# Patient Record
Sex: MALE | Race: WHITE | NOT HISPANIC OR LATINO | Employment: OTHER | URBAN - METROPOLITAN AREA
[De-identification: names, ages, dates, MRNs, and addresses within clinical notes are randomized per-mention and may not be internally consistent; named-entity substitution may affect disease eponyms.]

---

## 2020-09-09 ENCOUNTER — OFFICE VISIT (OUTPATIENT)
Dept: NEUROLOGY | Facility: CLINIC | Age: 80
End: 2020-09-09
Payer: MEDICARE

## 2020-09-09 VITALS
DIASTOLIC BLOOD PRESSURE: 86 MMHG | WEIGHT: 152.75 LBS | HEART RATE: 98 BPM | SYSTOLIC BLOOD PRESSURE: 142 MMHG | TEMPERATURE: 98 F | RESPIRATION RATE: 16 BRPM

## 2020-09-09 DIAGNOSIS — R41.3 MEMORY LOSS: ICD-10-CM

## 2020-09-09 DIAGNOSIS — I69.30 LATE EFFECT OF CEREBROVASCULAR ACCIDENT (CVA): Primary | ICD-10-CM

## 2020-09-09 DIAGNOSIS — I67.2 CEREBRAL ATHEROSCLEROSIS: ICD-10-CM

## 2020-09-09 DIAGNOSIS — G47.09 OTHER INSOMNIA: ICD-10-CM

## 2020-09-09 DIAGNOSIS — F41.9 ANXIETY: ICD-10-CM

## 2020-09-09 DIAGNOSIS — R03.0 ELEVATED BLOOD-PRESSURE READING WITHOUT DIAGNOSIS OF HYPERTENSION: ICD-10-CM

## 2020-09-09 DIAGNOSIS — H91.93 BILATERAL HEARING LOSS, UNSPECIFIED HEARING LOSS TYPE: ICD-10-CM

## 2020-09-09 PROCEDURE — 1126F PR PAIN SEVERITY QUANTIFIED, NO PAIN PRESENT: ICD-10-PCS | Mod: S$GLB,,, | Performed by: PSYCHIATRY & NEUROLOGY

## 2020-09-09 PROCEDURE — 1159F PR MEDICATION LIST DOCUMENTED IN MEDICAL RECORD: ICD-10-PCS | Mod: S$GLB,,, | Performed by: PSYCHIATRY & NEUROLOGY

## 2020-09-09 PROCEDURE — 99204 PR OFFICE/OUTPT VISIT, NEW, LEVL IV, 45-59 MIN: ICD-10-PCS | Mod: S$GLB,,, | Performed by: PSYCHIATRY & NEUROLOGY

## 2020-09-09 PROCEDURE — 1159F MED LIST DOCD IN RCRD: CPT | Mod: S$GLB,,, | Performed by: PSYCHIATRY & NEUROLOGY

## 2020-09-09 PROCEDURE — 99999 PR PBB SHADOW E&M-NEW PATIENT-LVL III: ICD-10-PCS | Mod: PBBFAC,,, | Performed by: PSYCHIATRY & NEUROLOGY

## 2020-09-09 PROCEDURE — 1101F PR PT FALLS ASSESS DOC 0-1 FALLS W/OUT INJ PAST YR: ICD-10-PCS | Mod: CPTII,S$GLB,, | Performed by: PSYCHIATRY & NEUROLOGY

## 2020-09-09 PROCEDURE — 1101F PT FALLS ASSESS-DOCD LE1/YR: CPT | Mod: CPTII,S$GLB,, | Performed by: PSYCHIATRY & NEUROLOGY

## 2020-09-09 PROCEDURE — 99204 OFFICE O/P NEW MOD 45 MIN: CPT | Mod: S$GLB,,, | Performed by: PSYCHIATRY & NEUROLOGY

## 2020-09-09 PROCEDURE — 1126F AMNT PAIN NOTED NONE PRSNT: CPT | Mod: S$GLB,,, | Performed by: PSYCHIATRY & NEUROLOGY

## 2020-09-09 PROCEDURE — 99999 PR PBB SHADOW E&M-NEW PATIENT-LVL III: CPT | Mod: PBBFAC,,, | Performed by: PSYCHIATRY & NEUROLOGY

## 2020-09-09 RX ORDER — TRAZODONE HYDROCHLORIDE 50 MG/1
TABLET ORAL
Qty: 30 TABLET | Refills: 11 | Status: SHIPPED | OUTPATIENT
Start: 2020-09-09 | End: 2020-11-03

## 2020-09-09 RX ORDER — MELOXICAM 7.5 MG/1
TABLET ORAL
COMMUNITY
Start: 2020-08-17

## 2020-09-09 NOTE — LETTER
September 9, 2020      Carmine Bermudez MD  [address]  [facility] [address]           [place] [facility]  [address] [address]  Phone: [phone]  Fax: [phone]          Patient: Joaquin Kline   MR Number: [id number]   YOB: 1940   Date of Visit: 9/9/2020       Dear Dr. Carmine Bermudez:    Thank you for referring Joaquin Kline to me for evaluation. Attached you will find relevant portions of my assessment and plan of care.    If you have questions, please do not hesitate to call me. I look forward to following Joaquin Kline along with you.    Sincerely,    Nima Patel MD    Enclosure  CC:  No Recipients    If you would like to receive this communication electronically, please contact [email or web address] or [phone] to request more information on [employer] Link access.    For providers and/or their staff who would like to refer a patient to [facility], please contact us through our one-stop-shop provider referral line, [facility] [place], at [phone].    If you feel you have received this communication in error or would no longer like to receive these types of communications, please e-mail [email or web address]

## 2020-09-09 NOTE — PROGRESS NOTES
"Consult from Dr Bermudez    HPI: Joaquin Kline is a 79 y.o. male with a history of memory problems first noted by wife - son is here today.    Patient's father had dementia as did two sibling    Wife sends note saying the patient has poor sleep a appetite and says "something is wrong with my head" at times and holds hands to his head.   He seems a bit confused at night and does not sleep well.   No known prior history of stroke  Son does not know when any symptoms would have started    Son does not notice any clear memory symptoms.     Not really repeating self much     Level of education completed is high school diploma/GED former     No tremor, no frequent falls. Uses a walker due to fear of falling    Patient does help with some house work, wife manages bills and cooking.     Mood is discussed as anxious at times.    BP was elevated the same as today at PCP's office      He is chronically hearing impaired and trying to get some hearing aides  Patient lives with wife. Here with son today. Patient no longer drives  Review of Systems   Constitutional: Negative for fever.   HENT: Negative for nosebleeds.    Eyes: Negative for double vision.   Respiratory: Negative for hemoptysis.    Cardiovascular: Negative for leg swelling.   Gastrointestinal: Negative for blood in stool.   Genitourinary: Negative for hematuria.   Musculoskeletal: Negative for falls.   Skin: Negative for rash.   Neurological: Negative for tremors.   Psychiatric/Behavioral: Negative for hallucinations. The patient is nervous/anxious and has insomnia.          I have reviewed all of this patient's past medical and surgical histories as well as family and social histories and active allergies and medications as documented in the electronic medical record.        Exam:  Gen Appearance, well developed/nourished in no apparent distress  CV: 2+ distal pulses with no edema or swelling  Neuro:  MS: Awake, alert, oriented to place, person, time, " situation. Sustains attention. Recent recall is mildly impaired to recent details/remote memory intact, Language is full to spontaneous speech/repetition/naming/comprehension. Fund of Knowledge is full  CN: Optic discs are flat with normal vasculature, PERRL, Extraoccular movements and visual fields are full. Normal facial sensation and strength, Hearing symmetric, Tongue and Palate are midline and strong. Shoulder Shrug symmetric and strong.  Uses Voice amplified due to prior surgery for esophageal cancer  Motor: Normal bulk, tone, no abnormal movements. 5/5 strength bilateral upper/lower extremities with 2+ reflexes and bilateral plantar response  Sensory: symmetric to light touch, pain, temp, and vibration/proprioception. Romberg negative  Cerebellar: Finger-nose,Heal-shin, Rapid alternating movements intact  Gait: Normal stance, no ataxia- walks with walker     Imaging: CT head 8/2020 atrophy associated with ventricular dilation with periventricular white matter changes and old right putamen lcunar infarct    Assessment/Plan: Joaquin Kline is a 79 y.o. male with vague memory and mood symptoms with insomnia more recently. 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 Start ASA 81mg daily. Patient may have some vascular dementia type changes. Son states this patient would not well tolerate and MRI brain. Will hold on this at this time  2. He may have some untreated HTN  -I asked family to take Blood pressure daily and report to PCP for treatment if blood pressure remains above 140/90  2. Carotid US, Holter monitor, Echo to look for other causes of stroke  3. Needs fasting lipid panel and fasting glucose (CMP) check per order  -will try to obtain B12, SAM, TSH, and RPR recently done with PCP  4. Esophageal cancer history with trach noted  -Has chronic hearing loss. Encouraged seeking out hear aides further given the possible benefit for his memory  5. Try trazodone for insomnia  and anxiety and possible sundowning Unless sedation, mood changes or other side effects    RTC 6 weeks.   CC:  Dr Bermudez

## 2020-09-09 NOTE — PATIENT INSTRUCTIONS
There is an old stroke on your CT scan which may explain some of your symptoms.    Start 81mg Asprin every night.    Check  Blood pressure daily and report to PCP for treatment if blood pressure remains above 140/90    Will check Carotid US, Holter monitor, Echo to look for other causes of stroke    Need fasting labs      Consider trying hearing aides     Try med (trazodone) for insomnia and anxiety

## 2020-09-16 ENCOUNTER — HOSPITAL ENCOUNTER (OUTPATIENT)
Dept: RADIOLOGY | Facility: HOSPITAL | Age: 80
Discharge: HOME | End: 2020-09-16
Attending: PSYCHIATRY & NEUROLOGY
Payer: MEDICARE

## 2020-09-16 ENCOUNTER — HOSPITAL ENCOUNTER (OUTPATIENT)
Dept: PULMONOLOGY | Facility: HOSPITAL | Age: 80
Discharge: HOME | End: 2020-09-16
Attending: PSYCHIATRY & NEUROLOGY
Payer: MEDICARE

## 2020-09-16 VITALS — HEIGHT: 65 IN | WEIGHT: 152 LBS | BODY MASS INDEX: 25.33 KG/M2

## 2020-09-16 DIAGNOSIS — R41.3 MEMORY LOSS: ICD-10-CM

## 2020-09-16 DIAGNOSIS — F41.9 ANXIETY: ICD-10-CM

## 2020-09-16 DIAGNOSIS — I67.2 CEREBRAL ATHEROSCLEROSIS: ICD-10-CM

## 2020-09-16 DIAGNOSIS — R03.0 ELEVATED BLOOD-PRESSURE READING WITHOUT DIAGNOSIS OF HYPERTENSION: ICD-10-CM

## 2020-09-16 DIAGNOSIS — I69.30 LATE EFFECT OF CEREBROVASCULAR ACCIDENT (CVA): ICD-10-CM

## 2020-09-16 DIAGNOSIS — G47.09 OTHER INSOMNIA: ICD-10-CM

## 2020-09-16 PROCEDURE — 93306 TTE W/DOPPLER COMPLETE: CPT | Mod: 26,,, | Performed by: INTERNAL MEDICINE

## 2020-09-16 PROCEDURE — 93227 XTRNL ECG REC<48 HR R&I: CPT | Mod: ,,, | Performed by: INTERNAL MEDICINE

## 2020-09-16 PROCEDURE — 93225 XTRNL ECG REC<48 HRS REC: CPT

## 2020-09-16 PROCEDURE — 93306 TTE W/DOPPLER COMPLETE: CPT

## 2020-09-16 PROCEDURE — 93227 HOLTER MONITOR - 24 HOUR (CUPID ONLY): ICD-10-PCS | Mod: ,,, | Performed by: INTERNAL MEDICINE

## 2020-09-16 PROCEDURE — 93306 ECHO (CUPID ONLY): ICD-10-PCS | Mod: 26,,, | Performed by: INTERNAL MEDICINE

## 2020-09-17 ENCOUNTER — TELEPHONE (OUTPATIENT)
Dept: NEUROLOGY | Facility: CLINIC | Age: 80
End: 2020-09-17

## 2020-09-17 RX ORDER — ATORVASTATIN CALCIUM 10 MG/1
10 TABLET, FILM COATED ORAL NIGHTLY
Qty: 90 TABLET | Refills: 3 | Status: SHIPPED | OUTPATIENT
Start: 2020-09-17 | End: 2021-09-17

## 2020-09-17 NOTE — TELEPHONE ENCOUNTER
Wife states that the patient has not slept in 2 days and is acting antsy and irritable. He has been on Trazodone 50 mg, .5 tablet qhs, since 9/9/2020. Wife is asking if they can increase to the full tablet. I spoke with Dr Patel and she did advise that the patient can increase to 1 full tablet starting tonight. Patients wife able to verbalize understanding.

## 2020-09-18 LAB
AORTIC ROOT ANNULUS: 3.18 CM
AV INDEX (PROSTH): 0.87
AV MEAN GRADIENT: 2 MMHG
AV PEAK GRADIENT: 4 MMHG
AV VALVE AREA: 2.68 CM2
AV VELOCITY RATIO: 0.71
BSA FOR ECHO PROCEDURE: 1.78 M2
CV ECHO LV RWT: 0.39 CM
DOP CALC AO PEAK VEL: 1.04 M/S
DOP CALC AO VTI: 19.15 CM
DOP CALC LVOT AREA: 3.1 CM2
DOP CALC LVOT DIAMETER: 1.98 CM
DOP CALC LVOT PEAK VEL: 0.74 M/S
DOP CALC LVOT STROKE VOLUME: 51.33 CM3
DOP CALCLVOT PEAK VEL VTI: 16.68 CM
E WAVE DECELERATION TIME: 197.87 MSEC
E/A RATIO: 0.59
ECHO LV POSTERIOR WALL: 0.81 CM (ref 0.6–1.1)
FRACTIONAL SHORTENING: 35 % (ref 28–44)
INTERVENTRICULAR SEPTUM: 0.88 CM (ref 0.6–1.1)
IVRT: 92.27 MSEC
LA MAJOR: 3.72 CM
LA WIDTH: 3.07 CM
LEFT ATRIUM SIZE: 2.81 CM
LEFT INTERNAL DIMENSION IN SYSTOLE: 2.73 CM (ref 2.1–4)
LEFT VENTRICLE DIASTOLIC VOLUME INDEX: 44.29 ML/M2
LEFT VENTRICLE DIASTOLIC VOLUME: 77.96 ML
LEFT VENTRICLE MASS INDEX: 62 G/M2
LEFT VENTRICLE SYSTOLIC VOLUME INDEX: 15.7 ML/M2
LEFT VENTRICLE SYSTOLIC VOLUME: 27.64 ML
LEFT VENTRICULAR INTERNAL DIMENSION IN DIASTOLE: 4.19 CM (ref 3.5–6)
LEFT VENTRICULAR MASS: 108.54 G
MV PEAK A VEL: 1.23 M/S
MV PEAK E VEL: 0.72 M/S
MV STENOSIS PRESSURE HALF TIME: 57.38 MS
MV VALVE AREA P 1/2 METHOD: 3.83 CM2
OHS CV EVENT MONITOR DAY: 0
OHS CV HOLTER LENGTH DECIMAL HOURS: 24
OHS CV HOLTER LENGTH HOURS: 24
OHS CV HOLTER LENGTH MINUTES: 0
PISA TR MAX VEL: 1.87 M/S
PULM VEIN S/D RATIO: 1.95
PV PEAK D VEL: 0.2 M/S
PV PEAK S VEL: 0.39 M/S
PV PEAK VELOCITY: 0.7 CM/S
RA PRESSURE: 3 MMHG
RIGHT VENTRICULAR END-DIASTOLIC DIMENSION: 3.25 CM
TR MAX PG: 14 MMHG
TV REST PULMONARY ARTERY PRESSURE: 17 MMHG

## 2020-11-03 ENCOUNTER — OFFICE VISIT (OUTPATIENT)
Dept: NEUROLOGY | Facility: CLINIC | Age: 80
End: 2020-11-03
Payer: MEDICARE

## 2020-11-03 VITALS — HEIGHT: 70 IN | BODY MASS INDEX: 21.81 KG/M2

## 2020-11-03 DIAGNOSIS — R41.3 MEMORY LOSS: ICD-10-CM

## 2020-11-03 DIAGNOSIS — I69.30 LATE EFFECT OF CEREBROVASCULAR ACCIDENT (CVA): Primary | ICD-10-CM

## 2020-11-03 DIAGNOSIS — F41.9 ANXIETY: ICD-10-CM

## 2020-11-03 PROCEDURE — 1101F PR PT FALLS ASSESS DOC 0-1 FALLS W/OUT INJ PAST YR: ICD-10-PCS | Mod: CPTII,95,, | Performed by: PSYCHIATRY & NEUROLOGY

## 2020-11-03 PROCEDURE — 99214 OFFICE O/P EST MOD 30 MIN: CPT | Mod: 95,,, | Performed by: PSYCHIATRY & NEUROLOGY

## 2020-11-03 PROCEDURE — 1159F PR MEDICATION LIST DOCUMENTED IN MEDICAL RECORD: ICD-10-PCS | Mod: 95,,, | Performed by: PSYCHIATRY & NEUROLOGY

## 2020-11-03 PROCEDURE — 1159F MED LIST DOCD IN RCRD: CPT | Mod: 95,,, | Performed by: PSYCHIATRY & NEUROLOGY

## 2020-11-03 PROCEDURE — 1101F PT FALLS ASSESS-DOCD LE1/YR: CPT | Mod: CPTII,95,, | Performed by: PSYCHIATRY & NEUROLOGY

## 2020-11-03 PROCEDURE — 99214 PR OFFICE/OUTPT VISIT, EST, LEVL IV, 30-39 MIN: ICD-10-PCS | Mod: 95,,, | Performed by: PSYCHIATRY & NEUROLOGY

## 2020-11-03 RX ORDER — QUETIAPINE FUMARATE 50 MG/1
TABLET, FILM COATED ORAL
Qty: 30 TABLET | Refills: 11 | Status: SHIPPED | OUTPATIENT
Start: 2020-11-03

## 2020-11-03 NOTE — PROGRESS NOTES
The patient location is: home  The chief complaint leading to consultation is: dementia    Visit type: audiovisual    Face to Face time with patient: 14 minutes  20 minutes of total time spent on the encounter, which includes face to face time and non-face to face time preparing to see the patient (eg, review of tests), Obtaining and/or reviewing separately obtained history, Documenting clinical information in the electronic or other health record, Independently interpreting results (not separately reported) and communicating results to the patient/family/caregiver, or Care coordination (not separately reported).         Each patient to whom he or she provides medical services by telemedicine is:  (1) informed of the relationship between the physician and patient and the respective role of any other health care provider with respect to management of the patient; and (2) notified that he or she may decline to receive medical services by telemedicine and may withdraw from such care at any time.    Notes:         HPI: Joaquin Kline is a 79 y.o. male with a history of memory problems    Here for 6 weeks follow-up    He started trazodone at the last visit and sleep is still poor. He does not sleep longer than 45 minutes at times and he rarely naps during the day  He reports anxiety and wife thinks he more afraid and agitated.   No changes with trazodone.   No clear hallucinations and no delusion but he is very bothered at night.  He paces a great deal during the day  If wife takes a nap during the day, he often interrupts her.     BP has been normal per wife- she checks this daily    ROS of systems not reliable due to dementia        I have reviewed all of this patient's past medical and surgical histories as well as family and social histories and active allergies and medications as documented in the electronic medical record.      Exam:  Gen Appearance, well developed/nourished     Neuro:  MS: Awake, alert, Sustains  attention.   Paces nervously    The remainder of the exam is limited due to telemedicine nature of the visit       Imaging: CT head 8/2020 atrophy associated with ventricular dilation with periventricular white matter changes and old right putamen lcunar infarct    9/2020 Echo  · :Normal left ventricular systolic function. The estimated ejection fraction is 60%.  · Normal right ventricular systolic function.  · Normal LV diastolic function.  · The estimated PA systolic pressure is 17 mmHg.  · Normal central venous pressure (3 mmHg).  There is no evidence of intracardiac shunting.    9/2020 holter:   Normal heart rate behavior  Very rare ectopy     Labs: 9/2020 , CMP normal  2020 B12, SAM, TSH, and RPR  Unremarkable except for lower normal B12    Assessment/Plan: Joaquin Kline is a 79 y.o. male with vague memory and mood symptoms with insomnia more recently. 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 Started ASA 81mg daily at initial visit. Patient may have some vascular dementia type changes. Son states this patient would not well tolerate  MRI brain. Will hold on this at this time  2. He may have some untreated HTN- PCP, wife monitoring (states improved at this time)  -Carotid US never done- patient refused. Can consider again later, but wife states he would decline surgery  -2020  Holter monitor showed no afib, Echo unremarkable  -started statin, low dose for goal LDL less than 100 given stroke history. Follow CMP, Lipids after next visit if not done via other providers prior  3. B12 lower normal on labs with PCP this year. Start 1000mcg B12 daily  4. Esophageal cancer history with trach noted  -Has chronic hearing loss. Encouraged seeking out hear aides further given the possible benefit for his memory  5. Trazodone for insomnia and anxiety and possible sundowning  Not effective  -taper Trazodone to 1/2 tablet at night for 2 weeks, then stop  -Try Seroquel per  orders. Discussed risk of movement disorder and cardiac risk  In elderly patient (low if dose remains low)    RTC  4 months

## 2021-01-21 ENCOUNTER — TELEPHONE (OUTPATIENT)
Dept: NEUROLOGY | Facility: CLINIC | Age: 81
End: 2021-01-21

## 2021-01-21 DIAGNOSIS — I69.30 LATE EFFECT OF CEREBROVASCULAR ACCIDENT (CVA): Primary | ICD-10-CM

## 2021-01-21 DIAGNOSIS — F01.518 VASCULAR DEMENTIA WITH BEHAVIOR DISTURBANCE: ICD-10-CM

## 2021-01-21 DIAGNOSIS — R63.4 WEIGHT LOSS: ICD-10-CM
